# Patient Record
Sex: FEMALE | Race: WHITE | NOT HISPANIC OR LATINO | Employment: UNEMPLOYED | URBAN - METROPOLITAN AREA
[De-identification: names, ages, dates, MRNs, and addresses within clinical notes are randomized per-mention and may not be internally consistent; named-entity substitution may affect disease eponyms.]

---
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
## 2022-02-04 ENCOUNTER — HOSPITAL ENCOUNTER (EMERGENCY)
Facility: MEDICAL CENTER | Age: 22
End: 2022-02-04
Attending: EMERGENCY MEDICINE

## 2022-02-04 ENCOUNTER — APPOINTMENT (OUTPATIENT)
Dept: RADIOLOGY | Facility: MEDICAL CENTER | Age: 22
End: 2022-02-04
Attending: EMERGENCY MEDICINE

## 2022-02-04 VITALS
RESPIRATION RATE: 16 BRPM | HEART RATE: 75 BPM | TEMPERATURE: 97.3 F | DIASTOLIC BLOOD PRESSURE: 85 MMHG | WEIGHT: 220 LBS | SYSTOLIC BLOOD PRESSURE: 130 MMHG | OXYGEN SATURATION: 99 % | BODY MASS INDEX: 35.36 KG/M2 | HEIGHT: 66 IN

## 2022-02-04 DIAGNOSIS — S92.902A CLOSED FRACTURE OF LEFT FOOT, INITIAL ENCOUNTER: ICD-10-CM

## 2022-02-04 PROCEDURE — 73630 X-RAY EXAM OF FOOT: CPT | Mod: LT

## 2022-02-04 PROCEDURE — 99284 EMERGENCY DEPT VISIT MOD MDM: CPT

## 2022-02-04 PROCEDURE — 73610 X-RAY EXAM OF ANKLE: CPT | Mod: LT

## 2022-02-04 RX ORDER — HYDROCODONE BITARTRATE AND ACETAMINOPHEN 5; 325 MG/1; MG/1
1-2 TABLET ORAL EVERY 6 HOURS PRN
Qty: 15 TABLET | Refills: 0 | Status: SHIPPED | OUTPATIENT
Start: 2022-02-04 | End: 2022-02-07
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
## 2022-02-04 NOTE — ED NOTES
"Pt discharged to home w/ family. Pt A&Ox4 on RA. Pt in possession of belongings. Pt provided discharge education and information pertaining to medications and follow up appointments. Pt received copy of discharge instructions and verbalized understanding. /85   Pulse 75   Temp 36.3 °C (97.3 °F) (Temporal)   Resp 16   Ht 1.676 m (5' 6\")   Wt 99.8 kg (220 lb)   LMP 01/30/2022   SpO2 99%   BMI 35.51 kg/m²   "
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
## 2022-02-15 ENCOUNTER — HOSPITAL ENCOUNTER (OUTPATIENT)
Dept: RADIOLOGY | Facility: MEDICAL CENTER | Age: 22
End: 2022-02-15
Attending: ORTHOPAEDIC SURGERY

## 2022-02-15 DIAGNOSIS — S92.344D CLOSED NONDISPLACED FRACTURE OF FOURTH METATARSAL BONE OF RIGHT FOOT WITH ROUTINE HEALING, SUBSEQUENT ENCOUNTER: ICD-10-CM

## 2022-02-15 PROCEDURE — 73700 CT LOWER EXTREMITY W/O DYE: CPT | Mod: LT

## 2022-02-24 ENCOUNTER — ANESTHESIA EVENT (OUTPATIENT)
Dept: SURGERY | Facility: MEDICAL CENTER | Age: 22
End: 2022-02-24

## 2022-02-24 ENCOUNTER — APPOINTMENT (OUTPATIENT)
Dept: RADIOLOGY | Facility: MEDICAL CENTER | Age: 22
End: 2022-02-24
Attending: ORTHOPAEDIC SURGERY

## 2022-02-24 ENCOUNTER — ANESTHESIA (OUTPATIENT)
Dept: SURGERY | Facility: MEDICAL CENTER | Age: 22
End: 2022-02-24

## 2022-02-24 ENCOUNTER — HOSPITAL ENCOUNTER (OUTPATIENT)
Facility: MEDICAL CENTER | Age: 22
End: 2022-02-24
Attending: ORTHOPAEDIC SURGERY | Admitting: ORTHOPAEDIC SURGERY

## 2022-02-24 VITALS
SYSTOLIC BLOOD PRESSURE: 128 MMHG | TEMPERATURE: 98.1 F | DIASTOLIC BLOOD PRESSURE: 75 MMHG | HEIGHT: 66 IN | HEART RATE: 94 BPM | RESPIRATION RATE: 17 BRPM | BODY MASS INDEX: 36.81 KG/M2 | OXYGEN SATURATION: 100 % | WEIGHT: 229.06 LBS

## 2022-02-24 DIAGNOSIS — G89.18 ACUTE POSTOPERATIVE PAIN OF EXTREMITY: ICD-10-CM

## 2022-02-24 LAB
EXTERNAL QUALITY CONTROL: NORMAL
HCG UR QL: NEGATIVE
SARS-COV+SARS-COV-2 AG RESP QL IA.RAPID: NEGATIVE

## 2022-02-24 PROCEDURE — 87426 SARSCOV CORONAVIRUS AG IA: CPT | Performed by: ORTHOPAEDIC SURGERY

## 2022-02-24 PROCEDURE — 160048 HCHG OR STATISTICAL LEVEL 1-5: Performed by: ORTHOPAEDIC SURGERY

## 2022-02-24 PROCEDURE — 160039 HCHG SURGERY MINUTES - EA ADDL 1 MIN LEVEL 3: Performed by: ORTHOPAEDIC SURGERY

## 2022-02-24 PROCEDURE — 502000 HCHG MISC OR IMPLANTS RC 0278: Performed by: ORTHOPAEDIC SURGERY

## 2022-02-24 PROCEDURE — 73630 X-RAY EXAM OF FOOT: CPT | Mod: LT

## 2022-02-24 PROCEDURE — 64445 NJX AA&/STRD SCIATIC NRV IMG: CPT | Performed by: ORTHOPAEDIC SURGERY

## 2022-02-24 PROCEDURE — 81025 URINE PREGNANCY TEST: CPT

## 2022-02-24 PROCEDURE — 160036 HCHG PACU - EA ADDL 30 MINS PHASE I: Performed by: ORTHOPAEDIC SURGERY

## 2022-02-24 PROCEDURE — A9270 NON-COVERED ITEM OR SERVICE: HCPCS | Performed by: ANESTHESIOLOGY

## 2022-02-24 PROCEDURE — C1713 ANCHOR/SCREW BN/BN,TIS/BN: HCPCS | Performed by: ORTHOPAEDIC SURGERY

## 2022-02-24 PROCEDURE — 160002 HCHG RECOVERY MINUTES (STAT): Performed by: ORTHOPAEDIC SURGERY

## 2022-02-24 PROCEDURE — 700105 HCHG RX REV CODE 258: Performed by: ORTHOPAEDIC SURGERY

## 2022-02-24 PROCEDURE — 700111 HCHG RX REV CODE 636 W/ 250 OVERRIDE (IP): Performed by: ANESTHESIOLOGY

## 2022-02-24 PROCEDURE — 160025 RECOVERY II MINUTES (STATS): Performed by: ORTHOPAEDIC SURGERY

## 2022-02-24 PROCEDURE — 160028 HCHG SURGERY MINUTES - 1ST 30 MINS LEVEL 3: Performed by: ORTHOPAEDIC SURGERY

## 2022-02-24 PROCEDURE — 160046 HCHG PACU - 1ST 60 MINS PHASE II: Performed by: ORTHOPAEDIC SURGERY

## 2022-02-24 PROCEDURE — 160009 HCHG ANES TIME/MIN: Performed by: ORTHOPAEDIC SURGERY

## 2022-02-24 PROCEDURE — 700102 HCHG RX REV CODE 250 W/ 637 OVERRIDE(OP): Performed by: ANESTHESIOLOGY

## 2022-02-24 PROCEDURE — 160035 HCHG PACU - 1ST 60 MINS PHASE I: Performed by: ORTHOPAEDIC SURGERY

## 2022-02-24 PROCEDURE — 501838 HCHG SUTURE GENERAL: Performed by: ORTHOPAEDIC SURGERY

## 2022-02-24 PROCEDURE — 500881 HCHG PACK, EXTREMITY: Performed by: ORTHOPAEDIC SURGERY

## 2022-02-24 DEVICE — SCREW CRTX 2.7X32MM ST T8 (2TX3+1TX6=12): Type: IMPLANTABLE DEVICE | Site: FOOT | Status: FUNCTIONAL

## 2022-02-24 DEVICE — SCREW LOCKING SELF TAPPING WITH T8 STARDRIVE VA RECESS 2.7MM 16MM (1TX6+1TX3=9): Type: IMPLANTABLE DEVICE | Site: FOOT | Status: FUNCTIONAL

## 2022-02-24 DEVICE — IMPLANTABLE DEVICE: Type: IMPLANTABLE DEVICE | Site: FOOT | Status: FUNCTIONAL

## 2022-02-24 DEVICE — SCREW CRTX 2.7X20MM ST T8 (3TX3+1TX6=15)(CYC=3): Type: IMPLANTABLE DEVICE | Site: FOOT | Status: FUNCTIONAL

## 2022-02-24 DEVICE — SCREW LOCKING SELF TAPPING WITH T8 STARDRIVE VA RECESS 2.7MM 22MM (1TX6+1TX3=9): Type: IMPLANTABLE DEVICE | Site: FOOT | Status: FUNCTIONAL

## 2022-02-24 DEVICE — WIRE K 1.6 X 150 292.16 (10EA/PK) (11TX10+2TX6+1TX20=142)(CYC=30): Type: IMPLANTABLE DEVICE | Site: FOOT | Status: FUNCTIONAL

## 2022-02-24 DEVICE — SCREW CRTX 2.7X14MM (3TX3+1TX6=15)(CYC=3): Type: IMPLANTABLE DEVICE | Site: FOOT | Status: FUNCTIONAL

## 2022-02-24 RX ORDER — HYDROMORPHONE HYDROCHLORIDE 1 MG/ML
0.4 INJECTION, SOLUTION INTRAMUSCULAR; INTRAVENOUS; SUBCUTANEOUS
Status: DISCONTINUED | OUTPATIENT
Start: 2022-02-24 | End: 2022-02-24 | Stop reason: HOSPADM

## 2022-02-24 RX ORDER — ACETAMINOPHEN 500 MG
1000 TABLET ORAL ONCE
Status: COMPLETED | OUTPATIENT
Start: 2022-02-24 | End: 2022-02-24

## 2022-02-24 RX ORDER — OXYCODONE HCL 5 MG/5 ML
10 SOLUTION, ORAL ORAL
Status: DISCONTINUED | OUTPATIENT
Start: 2022-02-24 | End: 2022-02-24 | Stop reason: HOSPADM

## 2022-02-24 RX ORDER — SODIUM CHLORIDE, SODIUM LACTATE, POTASSIUM CHLORIDE, CALCIUM CHLORIDE 600; 310; 30; 20 MG/100ML; MG/100ML; MG/100ML; MG/100ML
INJECTION, SOLUTION INTRAVENOUS CONTINUOUS
Status: ACTIVE | OUTPATIENT
Start: 2022-02-24 | End: 2022-02-24

## 2022-02-24 RX ORDER — OXYCODONE AND ACETAMINOPHEN 10; 325 MG/1; MG/1
.5-1 TABLET ORAL EVERY 4 HOURS PRN
Qty: 30 TABLET | Refills: 0 | Status: SHIPPED | OUTPATIENT
Start: 2022-02-24 | End: 2022-03-01

## 2022-02-24 RX ORDER — CEFAZOLIN SODIUM 1 G/3ML
INJECTION, POWDER, FOR SOLUTION INTRAMUSCULAR; INTRAVENOUS PRN
Status: DISCONTINUED | OUTPATIENT
Start: 2022-02-24 | End: 2022-02-24 | Stop reason: SURG

## 2022-02-24 RX ORDER — ONDANSETRON 2 MG/ML
4 INJECTION INTRAMUSCULAR; INTRAVENOUS
Status: DISCONTINUED | OUTPATIENT
Start: 2022-02-24 | End: 2022-02-24 | Stop reason: HOSPADM

## 2022-02-24 RX ORDER — GABAPENTIN 300 MG/1
300 CAPSULE ORAL ONCE
Status: COMPLETED | OUTPATIENT
Start: 2022-02-24 | End: 2022-02-24

## 2022-02-24 RX ORDER — DIPHENHYDRAMINE HYDROCHLORIDE 50 MG/ML
12.5 INJECTION INTRAMUSCULAR; INTRAVENOUS
Status: DISCONTINUED | OUTPATIENT
Start: 2022-02-24 | End: 2022-02-24 | Stop reason: HOSPADM

## 2022-02-24 RX ORDER — ONDANSETRON 2 MG/ML
INJECTION INTRAMUSCULAR; INTRAVENOUS PRN
Status: DISCONTINUED | OUTPATIENT
Start: 2022-02-24 | End: 2022-02-24 | Stop reason: SURG

## 2022-02-24 RX ORDER — DEXAMETHASONE SODIUM PHOSPHATE 4 MG/ML
INJECTION, SOLUTION INTRA-ARTICULAR; INTRALESIONAL; INTRAMUSCULAR; INTRAVENOUS; SOFT TISSUE PRN
Status: DISCONTINUED | OUTPATIENT
Start: 2022-02-24 | End: 2022-02-24 | Stop reason: SURG

## 2022-02-24 RX ORDER — HYDROMORPHONE HYDROCHLORIDE 1 MG/ML
0.1 INJECTION, SOLUTION INTRAMUSCULAR; INTRAVENOUS; SUBCUTANEOUS
Status: DISCONTINUED | OUTPATIENT
Start: 2022-02-24 | End: 2022-02-24 | Stop reason: HOSPADM

## 2022-02-24 RX ORDER — MEPERIDINE HYDROCHLORIDE 25 MG/ML
6.25 INJECTION INTRAMUSCULAR; INTRAVENOUS; SUBCUTANEOUS
Status: DISCONTINUED | OUTPATIENT
Start: 2022-02-24 | End: 2022-02-24 | Stop reason: HOSPADM

## 2022-02-24 RX ORDER — HYDROMORPHONE HYDROCHLORIDE 1 MG/ML
0.2 INJECTION, SOLUTION INTRAMUSCULAR; INTRAVENOUS; SUBCUTANEOUS
Status: DISCONTINUED | OUTPATIENT
Start: 2022-02-24 | End: 2022-02-24 | Stop reason: HOSPADM

## 2022-02-24 RX ORDER — OXYCODONE HCL 5 MG/5 ML
5 SOLUTION, ORAL ORAL
Status: DISCONTINUED | OUTPATIENT
Start: 2022-02-24 | End: 2022-02-24 | Stop reason: HOSPADM

## 2022-02-24 RX ORDER — SODIUM CHLORIDE, SODIUM LACTATE, POTASSIUM CHLORIDE, CALCIUM CHLORIDE 600; 310; 30; 20 MG/100ML; MG/100ML; MG/100ML; MG/100ML
INJECTION, SOLUTION INTRAVENOUS CONTINUOUS
Status: DISCONTINUED | OUTPATIENT
Start: 2022-02-24 | End: 2022-02-24 | Stop reason: HOSPADM

## 2022-02-24 RX ORDER — ROPIVACAINE HYDROCHLORIDE 5 MG/ML
INJECTION, SOLUTION EPIDURAL; INFILTRATION; PERINEURAL PRN
Status: DISCONTINUED | OUTPATIENT
Start: 2022-02-24 | End: 2022-02-24 | Stop reason: SURG

## 2022-02-24 RX ORDER — HALOPERIDOL 5 MG/ML
1 INJECTION INTRAMUSCULAR
Status: DISCONTINUED | OUTPATIENT
Start: 2022-02-24 | End: 2022-02-24 | Stop reason: HOSPADM

## 2022-02-24 RX ORDER — MIDAZOLAM HYDROCHLORIDE 1 MG/ML
INJECTION INTRAMUSCULAR; INTRAVENOUS PRN
Status: DISCONTINUED | OUTPATIENT
Start: 2022-02-24 | End: 2022-02-24 | Stop reason: SURG

## 2022-02-24 RX ADMIN — FENTANYL CITRATE 50 MCG: 50 INJECTION, SOLUTION INTRAMUSCULAR; INTRAVENOUS at 15:22

## 2022-02-24 RX ADMIN — GABAPENTIN 300 MG: 300 CAPSULE ORAL at 14:02

## 2022-02-24 RX ADMIN — SODIUM CHLORIDE, POTASSIUM CHLORIDE, SODIUM LACTATE AND CALCIUM CHLORIDE: 600; 310; 30; 20 INJECTION, SOLUTION INTRAVENOUS at 13:51

## 2022-02-24 RX ADMIN — PROPOFOL 200 MG: 10 INJECTION, EMULSION INTRAVENOUS at 15:46

## 2022-02-24 RX ADMIN — CEFAZOLIN 2 G: 330 INJECTION, POWDER, FOR SOLUTION INTRAMUSCULAR; INTRAVENOUS at 15:48

## 2022-02-24 RX ADMIN — DEXAMETHASONE SODIUM PHOSPHATE 8 MG: 4 INJECTION, SOLUTION INTRA-ARTICULAR; INTRALESIONAL; INTRAMUSCULAR; INTRAVENOUS; SOFT TISSUE at 15:46

## 2022-02-24 RX ADMIN — ONDANSETRON 4 MG: 2 INJECTION INTRAMUSCULAR; INTRAVENOUS at 15:46

## 2022-02-24 RX ADMIN — FENTANYL CITRATE 150 MCG: 50 INJECTION, SOLUTION INTRAMUSCULAR; INTRAVENOUS at 17:03

## 2022-02-24 RX ADMIN — ACETAMINOPHEN 1000 MG: 500 TABLET ORAL at 14:02

## 2022-02-24 RX ADMIN — ROPIVACAINE HYDROCHLORIDE 20 ML: 5 INJECTION, SOLUTION EPIDURAL; INFILTRATION; PERINEURAL at 15:23

## 2022-02-24 RX ADMIN — FENTANYL CITRATE 100 MCG: 50 INJECTION, SOLUTION INTRAMUSCULAR; INTRAVENOUS at 17:21

## 2022-02-24 RX ADMIN — MIDAZOLAM HYDROCHLORIDE 2 MG: 1 INJECTION, SOLUTION INTRAMUSCULAR; INTRAVENOUS at 15:22

## 2022-02-24 ASSESSMENT — PAIN DESCRIPTION - PAIN TYPE
TYPE: SURGICAL PAIN

## 2022-02-24 NOTE — ANESTHESIA PROCEDURE NOTES
Peripheral Block    Date/Time: 2/24/2022 3:23 PM  Performed by: Dm Jin M.D.  Authorized by: Dm Jin M.D.     Patient Location:  Pre-op  Start Time:  2/24/2022 3:23 PM  End Time:  2/24/2022 3:25 PM  Reason for Block: at surgeon's request and post-op pain management ONLY    patient identified, IV checked, site marked, risks and benefits discussed, surgical consent, monitors and equipment checked, pre-op evaluation and timeout performed    Patient Position:  Supine  Prep: ChloraPrep    Monitoring:  Heart rate, continuous pulse ox and cardiac monitor  Block Region:  Lower Extremity  Lower Extremity - Block Type:  SCIATIC nerve block, lateral approach    Laterality:  Left  Procedures: ultrasound guided  Image captured, interpreted and electronically stored.  Strength:  1 %  Dose:  3 ml  Block Type:  Single-shot  Needle Length:  100mm  Needle Gauge:  21 G  Needle Localization:  Ultrasound guidance  Injection Assessment:  Negative aspiration for heme, no paresthesia on injection, incremental injection and local visualized surrounding nerve on ultrasound  Evidence of intravascular injection: No     US Guided Sciatic Nerve Block   US probe placed several cm proximal to popliteal crease on posterior thigh and scanned caudad and cephalad until Sciatic Nerve (SN) identified superficial/lateral to popliteal artery.  Needle inserted lateral to probe in an in plane approach under direct visualization to a perineural position.  After negative aspiration LA injected with ease and visualized surrounding the SN.

## 2022-02-24 NOTE — ANESTHESIA PREPROCEDURE EVALUATION
" Case: [id number] Date/Time: 02/24/22 1415    Procedure: ORIF, FOOT - FOR SURGICAL FIXATION OF TARSOMETATARSAL FRACTURE/SUBLUXATIONS, OTHER INDICATED PROCEDURES    Pre-op diagnosis: CLOSED FRACTURE OF CUNEIFORM BONE OF FOOT    Location: [facility] OR 08 / SURGERY [facility]    Surgeons: Kenny Valle M.D.          Relevant Problems   No relevant active problems     /74   Pulse 89   Temp 36.1 °C (97 °F) (Temporal)   Resp 16   Ht 1.676 m (5' 6\")   Wt 104 kg (229 lb 0.9 oz)   LMP 01/30/2022   SpO2 96%   BMI 36.97 kg/m²     Physical Exam    Airway   Mallampati: II  TM distance: >3 FB  Neck ROM: full       Cardiovascular - normal exam  Rhythm: regular  Rate: normal  (-) murmur     Dental - normal exam           Pulmonary - normal exam  Breath sounds clear to auscultation     Abdominal    Neurological - normal exam                 Anesthesia Plan    ASA 1       Plan - general and peripheral nerve block     Peripheral nerve block will be post-op pain control  Airway plan will be LMA          Induction: intravenous    Postoperative Plan: Postoperative administration of opioids is intended.    Pertinent diagnostic labs and testing reviewed    Informed Consent:    Anesthetic plan and risks discussed with patient.    Use of blood products discussed with: patient whom consented to blood products.         "

## 2022-02-24 NOTE — ANESTHESIA PROCEDURE NOTES
Airway    Date/Time: 2/24/2022 3:46 PM  Performed by: Dm Jin M.D.  Authorized by: Dm Jin M.D.     Location:  OR  Urgency:  Elective  Difficult Airway: No    Indications for Airway Management:  Anesthesia      Spontaneous Ventilation: absent    Sedation Level:  Deep  Preoxygenated: Yes    Mask Difficulty Assessment:  0 - not attempted  Final Airway Type:  Supraglottic airway  Final Supraglottic Airway:  Standard LMA    SGA Size:  4  Number of Attempts at Approach:  1

## 2022-02-25 ASSESSMENT — PAIN SCALES - GENERAL: PAIN_LEVEL: 0

## 2022-02-25 NOTE — ANESTHESIA TIME REPORT
Anesthesia Start and Stop Event Times     Date Time Event    2/24/2022 1428 Ready for Procedure     1542 Anesthesia Start     1800 Anesthesia Stop        Responsible Staff  02/24/22    Name Role Begin End    Dm Jin M.D. Anesth 1542 1800        Preop Diagnosis (Free Text):  Pre-op Diagnosis     CLOSED FRACTURE OF CUNEIFORM BONE OF FOOT        Preop Diagnosis (Codes):    Premium Reason  A. 3PM - 7AM    Comments: block

## 2022-02-25 NOTE — OR NURSING
Arrived to Phase II after report from Jeni SMALLWOOD    VSS, denies nausea, reports no pain. Ambulated from gurney to chair with standby assist.    Surgical site covered in dry gauze, splint and ace bandage. Pt able to wiggle toes; cap refill <3 seconds; toes warm and pink.     Alarms on and set to appropriate parameters. Call light within reach, rounding in place. Provided with juice.     1950: Discharge instructions reviewed by SELIN Petersen as well as PIV removed. Patient discharged in stable condition via wheelchair to responsible adult with all personal belongings.

## 2022-02-25 NOTE — ANESTHESIA POSTPROCEDURE EVALUATION
Patient: Vidhya Nielson    Procedure Summary     Date: 02/24/22 Room / Location: [facility] 08 / SURGERY [facility]    Anesthesia Start: 1542 Anesthesia Stop: 1800    Procedure: ORIF, FOOT - FOR SURGICAL FIXATION OF TARSOMETATARSAL FRACTURE/SUBLUXATIONS, OTHER INDICATED PROCEDURES (Left Foot) Diagnosis: (CLOSED FRACTURE OF CUNEIFORM BONE OF FOOT)    Surgeons: Kenny Valle M.D. Responsible Provider: Dm Jin M.D.    Anesthesia Type: general, peripheral nerve block ASA Status: 1          Final Anesthesia Type: general, peripheral nerve block  Last vitals  BP   Blood Pressure: 128/75    Temp   36.7 °C (98.1 °F)    Pulse   94   Resp   17    SpO2   100 %      Anesthesia Post Evaluation    Patient location during evaluation: PACU  Patient participation: complete - patient participated  Level of consciousness: awake and alert  Pain score: 0    Airway patency: patent  Anesthetic complications: no  Cardiovascular status: hemodynamically stable  Respiratory status: acceptable  Hydration status: euvolemic    PONV: none          No complications documented.     Nurse Pain Score: 0 (NPRS)

## 2022-02-25 NOTE — OP REPORT
DATE OF SERVICE:  02/24/2022     PREOPERATIVE DIAGNOSES:  1.  Left first, second and third tarsometatarsal fracture disruption.  2.  Left fourth metatarsal base fracture.  3.  Left medial, intermediate and lateral cuneiform fractures.     POSTOPERATIVE DIAGNOSES:  1.  Left first, second and third tarsometatarsal fracture subluxations.  2.  Left fourth metatarsal base fracture.  3.  Left medial, intermediate and lateral cuneiform fractures.  4.  Left medial intercuneiform joint instability (tarsal subluxation).     PROCEDURES PERFORMED:  1.  Open treatment with internal fixation of left medial intercuneiform joint   (tarsal subluxation).  2.  Open treatment with internal fixation of left second tarsometatarsal joint   disruption.  3.  Open treatment with internal fixation, left first tarsometatarsal joint   disruption.  4.  Open treatment with internal fixation of Lisfranc joint involving the   second metatarsal base and medial intercuneiform (tarsometatarsal joint).  6.  Open treatment of third tarsometatarsal joint disruption without internal fixation.  7.  Closed treatment of fourth metatarsal base fracture.    SURGEON:  Kenny Valle MD     ANESTHESIOLOGIST:  Dm Jin MD     ANESTHESIA:  General and regional.     ESTIMATED BLOOD LOSS:  Minimal.     TOURNIQUET TIME:  107 minutes at 250 mmHg to left thigh.     IMPLANTS:  1.  Synthes 2.7 mm variable angle locking T plate on the 2nd tarsometatarsal   joint.  2.  Two 2.7 mm cortical screws, one 3.5 mm cortical screw and one 4.0 mm   cortical screw.     INDICATIONS FOR PROCEDURE:  The patient is a 21-year-old female.  She is about   2-1/2 to 3 weeks out from left foot injury where there was concern for   disruption of the tarsometatarsal joints.  CT imaging confirmed fractures of   the medial, intermediate and lateral cuneiforms fractures of the second and   third metatarsal bases with some subluxation of the first, second and third   tarsometatarsal joints as  well as intraarticular fracture at the base of the   fourth metatarsal.  Given that the lateral subluxation of the first through   third tarsometatarsal joint, I recommended surgical reduction and fixation.    She signed informed consent preoperatively and wished to proceed with surgery   as outlined above.     DESCRIPTION OF PROCEDURE:  The patient was met in the preoperative holding   area.  Her surgical site was signed.  Her consent was confirmed to be   accurate.  She was taken back to the operating room after Dr. Jin performed   a regional anesthetic at my request to help with postoperative pain control.    She was positioned in supine position. A thigh tourniquet was applied.  The   left lower extremity was provisionally cleansed with alcohol and then prepped   and draped in the usual sterile fashion.  A formal timeout was performed to   confirm the patient's correct name, correct surgical site, correct procedure   and correct laterality.  The limb was then exsanguinated with an Esmarch and   the tourniquet was inflated to 250 mmHg.  A dorsal incision was made centered   in between the second and first tarsometatarsal joints and I incised down   through skin.  Metzenbaum scissors were used to mobilize soft tissues and   cutaneous nerves.  I incised the periosteum over the Lisfranc joint and   exposed the second metatarsal base, which was dorsally subluxated and   translated laterally.  There was still some joint capsule intact more   laterally, the joint capsule was intact but attenuated at the first   tarsometatarsal joint.  There was instability of the first tarsometatarsal   joint.  There was also instability at the medial intercuneiform joint.  Using   a combination of reduction forceps, I was able to reduce the medial   intercuneiform joint and with percutaneous medial incision, placed a 2.7 mm   positional screw to stabilize that intertarsal instability.  I then used a   combination of reduction  forceps to reduce the first tarsometatarsal joint and   the second tarsometatarsal joint as well as the base of the second metatarsal   to the medial cuneiform joint across the Lisfranc articulation.  I then using   fluoroscopic guidance, drilled four 3.5 mm cortical screw from the second   metatarsal base to the intermediate cuneiform.  Unfortunately, however, this   screw was positioned a little bit too laterally and there was concern that may   be impinging on the lateral intercuneiform joint, so I removed it and attempt   to re-reduce it and placed another screw more proximally but was unable to   sufficiently placed this trajectory with a screw, so I therefore used a dorsal   2.7 mm variable angle locking T plate.  After I reduced the joints with   clamps, I positioned it dorsally, fixed it to the second metatarsal with 2   bicortical nonlocking screws and to the intermediate cuneiform with 2   unicortical locking screws.  Direct reduction of the articular surface looked   excellent.  Fluoroscopic imaging confirmed acceptable position of the implants   in the second tarsometatarsal joint.  I then prepared for and inserted a 4.0   mm cortical screw across the first tarsometatarsal joint and confirmed that   was acceptably positioned using fluoroscopic imaging.  There was still a   little bit of excess motion across the Lisfranc articulation at the second   metatarsal base and the medial intercuneiform, so I replaced a clamp across   that area and placed a 2.7 mm positional screw from the medial cuneiform to   the second metatarsal base stabilizing that articulation.  Final fluoroscopic   imaging confirmed acceptable alignment of the third tarsometatarsal joint   without internal fixation, acceptable alignment and position of the fourth   metatarsal base fracture, which is intraarticular and acceptable position of   the implants overall.  The wounds were thoroughly irrigated with normal saline   and repaired some  of the deep fascial layers and periosteal layers with 0   Vicryl, subcutaneous layers with 0 Vicryl, 2-0 Vicryl and the skin edges with   running 3-0 nylon.  The wounds were thoroughly cleansed, dried and a sterile   dressing and a well-padded short leg plaster splint was applied.  She then had   the tourniquet deflated after 107 minutes.  She was awoken from anesthesia   and transferred on the rStamps and taken to postanesthesia care unit in stable   condition.     PLAN:  1.  The patient will be discharged home when she recovers from anesthesia.  2.  She should be nonweightbearing left lower extremity in splint.  3.  She will follow up with me 2 weeks postop for routine wound check and   suture removal.        ______________________________  MD DEEPIKA Bolton/ENZO    DD:  02/24/2022 18:08  DT:  02/24/2022 19:08    Job#:  [id number]

## 2022-02-25 NOTE — OR SURGEON
Immediate Post OP Note    PreOp Diagnosis: Left foot lisfranc fx/subluxation      PostOp Diagnosis: same      Procedure(s):  ORIF, FOOT - FOR SURGICAL FIXATION OF TARSOMETATARSAL FRACTURE/SUBLUXATIONS, OTHER INDICATED PROCEDURES - Wound Class: Clean    Surgeon(s):  Kenny Valle M.D.    Anesthesiologist/Type of Anesthesia:  Anesthesiologist: Dm Jin M.D./General    Surgical Staff:  Circulator: Kedar Kumar R.N.  Relief Circulator: Jennifer Kraft R.N.  Relief Scrub: Padmini Harris  Scrub Person: Melquiades Hollins  Radiology Technologist: Lexi Moreno    Specimens removed if any:  * No specimens in log *    Estimated Blood Loss: minimal    Findings: see dictation    Complications: none known    PLAN:  --discharge to home from PACU  --NWB LLE in splint  --fu 2 weeks postop as scheduled        2/24/2022 5:57 PM Kenny Valle M.D.

## 2022-02-25 NOTE — DISCHARGE INSTRUCTIONS
ACTIVITY: Rest and take it easy for the first 24 hours.  A responsible adult is recommended to remain with you during that time.  It is normal to feel sleepy.  We encourage you to not do anything that requires balance, judgment or coordination.    MILD FLU-LIKE SYMPTOMS ARE NORMAL. YOU MAY EXPERIENCE GENERALIZED MUSCLE ACHES, THROAT IRRITATION, HEADACHE AND/OR SOME NAUSEA.    FOR 24 HOURS DO NOT:  Drive, operate machinery or run household appliances.  Drink beer or alcoholic beverages.   Make important decisions or sign legal documents.    SPECIAL INSTRUCTIONS:     -Non Weight Bearing Left Lower Extremity in splint  -keep splint clean and dry  --Prescription for Percocet sent to pharmacy, take as needed for moderate post-op pain, okay to take over the counter ibuprofen and/or tylenol as needed for mild postop pain  --Follow up in 2 weeks for post-op appointment as scheduled      Metatarsal Fracture  If you have a cast:  · Do not stick anything inside the cast to scratch your skin. Doing that increases your risk for infection.  · Check the skin around the cast every day. Tell your health care provider about any concerns.  · You may put lotion on dry skin around the edges of the cast. Do not put lotion on the skin underneath the cast.  · Keep the cast clean.  · If the cast is not waterproof:  ? Do not let it get wet.  ? Cover it with a watertight covering when you take a bath or a shower.  Activity  · Do not use your affected leg to support your body weight until your health care provider says that you can. Use crutches as directed.  · Ask your health care provider what activities are safe for you during recovery, and ask what activities you need to avoid.  · Do physical therapy exercises as directed.  Driving  · Do not drive or use heavy machinery while taking pain medicine.  · Do not drive while wearing a cast, splint, or boot on a foot that you use for driving.  Managing pain, stiffness, and swelling  · If  directed, put ice on painful areas:  ? Put ice in a plastic bag.  ? Place a towel between your skin and the bag.  § If you have a removable splint or boot, remove it as told by your health care provider.  § If you have a cast, place a towel between your cast and the bag.  ? Leave the ice on for 20 minutes, 2-3 times a day.  · Move your toes often to avoid stiffness and to lessen swelling.  · Raise (elevate) your lower leg above the level of your heart while you are sitting or lying down.  General instructions  · Do not put pressure on any part of the cast or splint until it is fully hardened. This may take several hours.  · Take over-the-counter and prescription medicines only as told by your health care provider.  · Do not use any products that contain nicotine or tobacco, such as cigarettes and e-cigarettes. These can delay bone healing. If you need help quitting, ask your health care provider.  · Do not take baths, swim, or use a hot tub until your health care provider approves. Ask your health care provider if you may take showers.  · Keep all follow-up visits as told by your health care provider. This is important.  Contact a health care provider if you have:  · Pain that gets worse or does not get better with medicine.  · A fever.  · A bad smell coming from your cast or splint.  Get help right away if you have:  · Any of the following in your toes or your foot, even after loosening your splint (if applicable):  ? Numbness.  ? Tingling.  ? Coldness.  ? Blue skin.  · Redness or swelling that gets worse.  · Pain that suddenly becomes severe.  Summary  · A metatarsal fracture is a break in one of the five bones that connect the toes to the rest of the foot.  · Treatment depends on how severe your fracture is and how the pieces of the broken bone line up with each other (alignment). This may include wearing a cast, splint, or supportive boot, or using crutches. Sometimes surgery is needed to align the bones.  · Ice  and elevate your foot to help lessen the pain and swelling.  · Make sure you know what symptoms should cause you to get help right away.  This information is not intended to replace advice given to you by your health care provider. Make sure you discuss any questions you have with your health care provider.    DIET: To avoid nausea, slowly advance diet as tolerated, avoiding spicy or greasy foods for the first day.  Add more substantial food to your diet according to your physician's instructions.  INCREASE FLUIDS AND FIBER TO AVOID CONSTIPATION.    SURGICAL DRESSING/BATHING: May shower in 24 hours: Do not get splint/cast wet. Cover it with a watertight covering when you take a shower.    FOLLOW-UP APPOINTMENT:  A follow-up appointment should be arranged with your doctor in 2 weeks; call to schedule.    You should CALL YOUR PHYSICIAN if you develop:  Fever greater than 101 degrees F.  Pain not relieved by medication, or persistent nausea or vomiting.  Excessive bleeding (blood soaking through dressing) or unexpected drainage from the wound.  Extreme redness or swelling around the incision site, drainage of pus or foul smelling drainage.  Inability to urinate or empty your bladder within 8 hours.  Problems with breathing or chest pain.    You should call 911 if you develop problems with breathing or chest pain.  If you are unable to contact your doctor or surgical center, you should go to the nearest emergency room or urgent care center.    Physician's telephone #: [phone] Dr. Valle    If any questions arise, call your doctor.  If your doctor is not available, please feel free to call the Surgical Center at [phone].     A registered nurse may call you a few days after your surgery to see how you are doing after your procedure.    MEDICATIONS: Resume taking daily medication.  Take prescribed pain medication with food.  If no medication is prescribed, you may take non-aspirin pain medication if needed.   PAIN MEDICATION CAN BE VERY CONSTIPATING.  Take a stool softener or laxative such as senokot, pericolace, or milk of magnesia if needed.    Prescription given for Percocet.  Last pain medication given at: none.    If your physician has prescribed pain medication that includes Acetaminophen (Tylenol), do not take additional Acetaminophen (Tylenol) while taking the prescribed medication.    Depression / Suicide Risk    As you are discharged from this [facility] facility, it is important to learn how to keep safe from harming yourself.    Recognize the warning signs:  · Abrupt changes in personality, positive or negative- including increase in energy   · Giving away possessions  · Change in eating patterns- significant weight changes-  positive or negative  · Change in sleeping patterns- unable to sleep or sleeping all the time   · Unwillingness or inability to communicate  · Depression  · Unusual sadness, discouragement and loneliness  · Talk of wanting to die  · Neglect of personal appearance   · Rebelliousness- reckless behavior  · Withdrawal from people/activities they love  · Confusion- inability to concentrate     If you or a loved one observes any of these behaviors or has concerns about self-harm, here's what you can do:  · Talk about it- your feelings and reasons for harming yourself  · Remove any means that you might use to hurt yourself (examples: pills, rope, extension cords, firearm)  · Get professional help from the community (Mental Health, Substance Abuse, psychological counseling)  · Do not be alone:Call your Safe Contact- someone whom you trust who will be there for you.  · Call your local CRISIS HOTLINE 328-3765 or 141-001-8427  · Call your local Children's Mobile Crisis Response Team [place] (577) 610-3228 or www.[employer] [facility]  · Call the toll free National Suicide Prevention Hotlines   · National Suicide Prevention Lifeline 395-365-YSNM (9353)  · National Hope Line Network 800-SUICIDE  (737-3970)

## 2022-02-25 NOTE — OR NURSING
1757 Pt arrived from OR via gurney. Report given by anesthesia and RN. Sleeping, PERRLA, opa, 10L mask, even non labored breathing, lungs clear airway patent. SB. Skin pink warm dry. PIV patent. LLE elevated, cold pack, dressing cdi, toes pink warm, brisk cap refill    1815 no changes.     1820 easily arousable. opa removed .Sponatneous even non labored breathing.     1821 clear speech, follow commands, DENIES pain, nausea, sob, chest pain. LLE wiggles toes, c/o numbness sec to peripheral nerve block. [place] warm, brisk cap refill.    1827 awake, sitting up, drinking water. DENIES pain and nausea.     1834 updated Romero, father. O2 at 75% full for transfer    1845 LLE dressing cdi, cold pack, elevated, pink warm brisk cap refill, wiggles toes     1900 awake alert, denies pain and nausea, drinking water.     1906 report given to Nena RN, phase rm 32. VSS ready for transfer

## (undated) DEVICE — Device

## (undated) DEVICE — TUBING CLEARLINK DUO-VENT - C-FLO (48EA/CA)

## (undated) DEVICE — DRAPE 36X28IN RAD CARM BND BG - (25/CA) O

## (undated) DEVICE — SODIUM CHL IRRIGATION 0.9% 1000ML (12EA/CA)

## (undated) DEVICE — NEEDLE NON SAFETY HYPO 22 GA X 1 1/2 IN (100/BX)

## (undated) DEVICE — DRAPE LARGE 3 QUARTER - (20/CA)

## (undated) DEVICE — SET EXTENSION WITH 2 PORTS (48EA/CA) ***PART #2C8610 IS A SUBSTITUTE*****

## (undated) DEVICE — BLADE SURGICAL #15 - (50/BX 3BX/CA)

## (undated) DEVICE — DRAPE C ARMOR (12EA/CA)

## (undated) DEVICE — DRAPE SURG STERI-DRAPE 7X11OD - (40EA/CA)

## (undated) DEVICE — SUCTION INSTRUMENT YANKAUER BULBOUS TIP W/O VENT (50EA/CA)

## (undated) DEVICE — HEAD HOLDER JUNIOR/ADULT

## (undated) DEVICE — SUTURE 3-0 ETHILON FS-1 - (36/BX) 30 INCH

## (undated) DEVICE — PAD LAP STERILE 18 X 18 - (5/PK 40PK/CA)

## (undated) DEVICE — SPLINT PLASTER 5 IN X 30 IN - (50EA/BX 6BX/CA)

## (undated) DEVICE — CANISTER SUCTION 3000ML MECHANICAL FILTER AUTO SHUTOFF MEDI-VAC NONSTERILE LF DISP  (40EA/CA)

## (undated) DEVICE — PROTECTOR ULNA NERVE - (36PR/CA)

## (undated) DEVICE — SUTURE GENERAL

## (undated) DEVICE — WATER IRRIGATION STERILE 1000ML (12EA/CA)

## (undated) DEVICE — GLOVE BIOGEL SZ 8.5 SURGICAL PF LTX - (50PR/BX 4BX/CA)

## (undated) DEVICE — ELECTRODE 850 FOAM ADHESIVE - HYDROGEL RADIOTRNSPRNT (50/PK)

## (undated) DEVICE — SUTURE 0 VICRYL PLUS CT-1 - 36 INCH (36/BX)

## (undated) DEVICE — SUTURE 2-0 VICRYL PLUS CT-1 36 (36PK/BX)"

## (undated) DEVICE — SET LEADWIRE 5 LEAD BEDSIDE DISPOSABLE ECG (1SET OF 5/EA)

## (undated) DEVICE — LACTATED RINGERS INJ 1000 ML - (14EA/CA 60CA/PF)

## (undated) DEVICE — SLEEVE, VASO, THIGH, MED

## (undated) DEVICE — DRILL BIT 2.0 LONG 310.21 - (6TX212) SDSTB=2

## (undated) DEVICE — CHLORAPREP 26 ML APPLICATOR - ORANGE TINT(25/CA)

## (undated) DEVICE — KIT ANESTHESIA W/CIRCUIT & 3/LT BAG W/FILTER (20EA/CA)

## (undated) DEVICE — MASK ANESTHESIA ADULT  - (100/CA)

## (undated) DEVICE — GLOVE BIOGEL PI ORTHO SZ 7.5 PF LF (40PR/BX)

## (undated) DEVICE — GOWN WARMING STANDARD FLEX - (30/CA)

## (undated) DEVICE — PADDING CAST 6 IN STERILE - 6 X 4 YDS (24/CA)

## (undated) DEVICE — SYRINGE 30 ML LL (56/BX)

## (undated) DEVICE — SENSOR SPO2 NEO LNCS ADHESIVE (20/BX) SEE USER NOTES

## (undated) DEVICE — GLOVE BIOGEL INDICATOR SZ 7.5 SURGICAL PF LTX - (50PR/BX 4BX/CA)

## (undated) DEVICE — ELECTRODE DUAL RETURN W/ CORD - (50/PK)

## (undated) DEVICE — PACK LOWER EXTREMITY - (2/CA)

## (undated) DEVICE — TOWEL STOP TIMEOUT SAFETY FLAG (40EA/CA)

## (undated) DEVICE — BANDAGE ELASTIC 6 HONEYCOMB - 6X5YD LF (20/CA)"

## (undated) DEVICE — NEPTUNE 4 PORT MANIFOLD - (20/PK)

## (undated) DEVICE — BOVIE BLADE COATED - (50/PK)